# Patient Record
Sex: FEMALE | Race: OTHER | Employment: UNEMPLOYED | URBAN - METROPOLITAN AREA
[De-identification: names, ages, dates, MRNs, and addresses within clinical notes are randomized per-mention and may not be internally consistent; named-entity substitution may affect disease eponyms.]

---

## 2024-08-14 ENCOUNTER — APPOINTMENT (OUTPATIENT)
Dept: CT IMAGING | Facility: HOSPITAL | Age: 54
End: 2024-08-14
Attending: EMERGENCY MEDICINE

## 2024-08-14 ENCOUNTER — HOSPITAL ENCOUNTER (EMERGENCY)
Facility: HOSPITAL | Age: 54
Discharge: HOME | End: 2024-08-15
Attending: EMERGENCY MEDICINE

## 2024-08-14 DIAGNOSIS — H53.8 BLURRED VISION: ICD-10-CM

## 2024-08-14 DIAGNOSIS — R51.9 NONINTRACTABLE HEADACHE, UNSPECIFIED CHRONICITY PATTERN, UNSPECIFIED HEADACHE TYPE: ICD-10-CM

## 2024-08-14 PROCEDURE — 99284 EMERGENCY DEPT VISIT MOD MDM: CPT

## 2024-08-14 PROCEDURE — 70450 CT HEAD/BRAIN W/O DYE: CPT | Performed by: EMERGENCY MEDICINE

## 2024-08-14 RX ORDER — TETRACAINE HYDROCHLORIDE 5 MG/ML
1 SOLUTION OPHTHALMIC ONCE
Status: COMPLETED | OUTPATIENT
Start: 2024-08-14 | End: 2024-08-14

## 2024-08-14 RX ORDER — CANDESARTAN CILEXETIL AND HYDROCHLOROTHIAZIDE 16; 12.5 MG/1; MG/1
1 TABLET ORAL DAILY
COMMUNITY

## 2024-08-15 VITALS
HEART RATE: 78 BPM | HEIGHT: 67 IN | OXYGEN SATURATION: 96 % | WEIGHT: 198.44 LBS | BODY MASS INDEX: 31.15 KG/M2 | TEMPERATURE: 98 F | DIASTOLIC BLOOD PRESSURE: 82 MMHG | SYSTOLIC BLOOD PRESSURE: 112 MMHG | RESPIRATION RATE: 16 BRPM

## 2024-08-15 NOTE — ED PROVIDER NOTES
Patient Seen in: [facility] Emergency Department      History     Chief Complaint   Patient presents with    Eye Visual Problem     Stated Complaint: eye pain since yesterday    Subjective:   HPI    Patient is a 54-year-old female foreign language speaking presents to ED for evaluation of blurry vision, headache.  History obtained by daughter as patient is for language speaking per patient preference.  Daughter states for the last 3 years, she has been having headaches 3 times per week.  Patient has not had any head trauma.  She complains of some blurred vision for the last couple days.  No vomiting.  No focal weakness, numbness or tingling.  She apparently was seen by an eye doctor in [place] and told she may have a problem with her eyes that needed surgery but never followed up.  She states her eyes feel oily.  She has no history of stroke.    Objective:   Past Medical History:    Asthma (HCC)    Essential hypertension    Hyperlipidemia              History reviewed. No pertinent surgical history.             Social History     Socioeconomic History    Marital status:    Tobacco Use    Smoking status: Never    Smokeless tobacco: Never   Vaping Use    Vaping status: Never Used   Substance and Sexual Activity    Alcohol use: Never    Drug use: Never              Review of Systems    Positive for stated Chief Complaint: Eye Visual Problem    Other systems are as noted in HPI.  Constitutional and vital signs reviewed.      All other systems reviewed and negative except as noted above.    Physical Exam     ED Triage Vitals [08/14/24 2120]   /73   Pulse 86   Resp 16   Temp 98 °F (36.7 °C)   Temp src Oral   SpO2 97 %   O2 Device None (Room air)       Current Vitals:   Vital Signs  BP: 112/82  Pulse: 78  Resp: 16  Temp: 98 °F (36.7 °C)  Temp src: Oral    Oxygen Therapy  SpO2: 96 %  O2 Device: None (Room air)        Right Eye Chart Acuity: 20/70, Uncorrected  Left Eye Chart Acuity: 20/70,  Uncorrected    Physical Exam    GENERAL: No acute distress, Well appearing and non-toxic, Alert and oriented X 3   HEENT: Normocephalic, atraumatic.  Moist mucous membranes.  Pupils equal round reactive to light accommodation, extraocular motion is intact, sclerae white, conjunctiva is pink.  Oropharynx is unremarkable, no exudate.  Fluorescein stain negative to both eyes.  Pressure measurement by tonometry in the right eye 10.  Pressure measurement left eye 13  NECK: Supple, trachea midline, no lymphadenopathy.   LUNG: Lungs clear to auscultation bilaterally, no wheezing, no rales, no rhonchi.  CARDIOVASCULAR: Regular rate and rhythm.  Normal S1S2.  No S3S4 or murmur.  ABDOMEN: Bowel sounds are present. Soft, nontender, nondistended, no pulsatile masses.    MUSCULOSKELETAL: No calf tenderness.  No clubbing, cyanosis, or edema.  Dorsalis pedis and posterior tibial pulses present  SKIN EXAMINATIoN: Warm and dry with normal appearance.  No rashes or lesions.  NEUROLOGICAL:  Awake,  Motor strength 5/5 all groups.  normal sensation.  Cranial nerves grossly intact II-XII.  Speech intact.  Visual fields intact bilaterally to confrontational testing    ED Course   Labs Reviewed - No data to display          I personally reviewd CT images of head and independent interpretation shows no acute bleed.  I also viewed formal radiology report as read by radiology with findings below:  CT BRAIN OR HEAD (CPT=70450)    Result Date: 8/14/2024  PROCEDURE:  CT BRAIN OR HEAD (60047)  COMPARISON:  None.  INDICATIONS:  eye pain since yesterday  TECHNIQUE:  Noncontrast CT scanning is performed through the brain. Dose reduction techniques were used. Dose information is transmitted to the ACR (American College of Radiology) NRDR (National Radiology Data Registry) which includes the Dose Index Registry.  PATIENT STATED HISTORY: (As transcribed by Technologist)  Patient reports bilateral eye pain/blurry vision with occasional pressure. Patient  denies dizziness/syncope    FINDINGS:  VENTRICLES/SULCI:  Ventricles and sulci are normal in size.  INTRACRANIAL:  There are no abnormal extraaxial fluid collections.  There is no midline shift.  There are no intraparenchymal brain abnormalities.  There is nothing specific for acute infarct.  There is no hemorrhage or mass lesion.  SINUSES:           No sign of acute sinusitis.  MASTOIDS:          No sign of acute inflammation. SKULL:             No evidence for fracture or osseous abnormality. OTHER:             None.            CONCLUSION:  No acute intracranial abnormality.    LOCATION:  [facility]   Dictated by (CST): Dustin Shelton MD on 8/14/2024 at 11:53 PM     Finalized by (CST): Dustin Shelton MD on 8/14/2024 at 11:54 PM               MDM      Patient is a 54-year-old female presents to ED for evaluation of intermittent headaches, blurred vision.  Differential migraine, tension headache, corneal abrasion, glaucoma.  Patient had screening head CT performed which was negative.  Visual acuity 20/70 both eyes.  Fluorescein stain negative.  Normal pressure in both eyes.  Recommend neurology and ophthalmology follow-up.  No acute findings noted.    Patient was screened and evaluated during this visit.   As a treating physician attending to the patient, I determined, within reasonable clinical confidence and prior to discharge, that an emergency medical condition was not or was no longer present.  There was no indication for further evaluation, treatment or admission on an emergency basis.  Comprehensive verbal and written discharge and follow-up instructions were provided to help prevent relapse or worsening.  Patient was instructed to follow-up with her primary care provider for further evaluation and treatment, but to return immediately to the ER for worsening, concerning, new, changing or persisting symptoms.  I discussed the case with the patient and they had no questions, complaints, or concerns.  Patient felt  comfortable going home.            Medical Decision Making      Disposition and Plan     Clinical Impression:  1. Blurred vision    2. Nonintractable headache, unspecified chronicity pattern, unspecified headache type         Disposition:  Discharge  8/15/2024 12:08 am    Follow-up:  Ayan Marquez DO  [address]  [address]  [facility] [id number] [phone]    Follow up in 2 day(s)      Charlie Ulloa MD  [facility]   [address] [id number] [phone]    Follow up in 1 week(s)            Medications Prescribed:  Discharge Medication List as of 8/15/2024 12:17 AM